# Patient Record
Sex: MALE | Race: WHITE | NOT HISPANIC OR LATINO | Employment: OTHER | URBAN - METROPOLITAN AREA
[De-identification: names, ages, dates, MRNs, and addresses within clinical notes are randomized per-mention and may not be internally consistent; named-entity substitution may affect disease eponyms.]

---

## 2021-11-17 ENCOUNTER — ALLIED HEALTH/NURSE VISIT (OUTPATIENT)
Dept: FAMILY MEDICINE | Facility: OTHER | Age: 76
End: 2021-11-17

## 2021-11-17 DIAGNOSIS — Z53.9 DIAGNOSIS FOR ++++ WALK IN CLINIC ++++: Primary | ICD-10-CM

## 2021-11-18 NOTE — PROGRESS NOTES
Patient walked into clinic.     The patient was seen for left foot redness, swelling, and open sore. [facility] The sore was located on the side of the patient's big toe. The foot was very red, warm to the touch, with +1 pitting edema. Patient has home care who comes into his home and the nurse jairo a line on where the redness stopped on the patient's ankle. The line was drawn two hours prior to coming. The redness was moving up the leg. Patient has a history of DM and a few weeks ago his right big toe was amputated.     Patient was evaluated by Nataliia Delgadillo PA-C.   During time in the clinic the symptoms were spreading quickly.       PLAN:   Patient was advised to be seen in ER tonight. The patient states that he isn't going tonight.       JHON BruceN, RN, PHN  [place]  November 17, 2021

## 2023-12-21 DIAGNOSIS — G89.29 OTHER CHRONIC PAIN: Primary | ICD-10-CM

## 2023-12-21 RX ORDER — OXYCODONE HYDROCHLORIDE 5 MG/1
5 TABLET ORAL EVERY 12 HOURS PRN
Qty: 60 TABLET | Refills: 0 | Status: SHIPPED | OUTPATIENT
Start: 2023-12-21 | End: 2024-03-05

## 2023-12-21 RX ORDER — PREGABALIN 150 MG/1
150 CAPSULE ORAL 2 TIMES DAILY
Qty: 60 CAPSULE | Refills: 3 | Status: SHIPPED | OUTPATIENT
Start: 2023-12-21 | End: 2024-01-26

## 2023-12-25 NOTE — PROGRESS NOTES
"[facility] GERIATRICS    PRIMARY CARE PROVIDER AND CLINIC:  DHAVAL Segura CNP, [address] / [place] [address]  Chief Complaint   Patient presents with    [facility] Medical Record Number:  [id number]  Place of Service where encounter took place:  [facility] () [[id number]]    Yandel Light  is a 78 year old  (1945), admitted to the above facility from  [facility]. Hospital stay 12/11/23 through 12/20/23..   HPI:    Yandel \"Jed\"Kuldeep is a 78-year-old male with past medical history significant for multiple comorbidities including atrial fibrillation on chronic anticoagulation, BPH, hypertension, type 2 diabetes on insulin, COPD, status post right-sided BKA, chronic pain syndrome, major depression, severe, with psychotic features, PTSD, and diabetic complications including neuropathy. The patient had been hospitalized in our facility 8/29/2023 to 9/1/2023 for generalized weakness and physical debility with inability to care for himself and was discharged to transitional care unit and has since transition back home apparently of the patient's own volition.     This admission, patient presents once again with inability to care for himself with no particular specific complaints. After transitioning back home, he fell and had mild trauma with a skin tear to the right wrist. Upon evaluation, patient had an unremarkable workup other than some elevated blood pressures that recovered nicely and an uncomplicated UTI that was successfully treated during this hospitalization. No acute abnormalities were identified. Patient has been hospitalized in a stable condition for more than a week we will reexplore outpatient community settings and ultimately he will discharge to a nursing home setting. Noted that medication modifications were relatively modest, gabapentin and oxycodone were added to help with chronic pain, nicotine replacement was added " for nicotine withdrawal symptoms, and melatonin was added to support sleep hygiene. His insulin dose needed to be slightly decreased likely related to dietary changes. Patient has been on stable psychotropic medications for quite [facility] His diagnosis of severe major depression with psychotic features is a recognized indication for the utilization of quetiapine as augmentation for duloxetine and seems to have served the patient well. Would strongly recommend against any adjustments to his psychotropic medications unless directed by his psychiatrist else he risks decompensation of his mental health.     The primary encounter diagnosis was Physical deconditioning. Diagnoses of Generalized muscle weakness, Unable to care for self, Resides in long term care facility, Status post below-knee amputation of right lower extremity (H), Chronic pain syndrome, Neuropathy, Benign prostatic hyperplasia, unspecified whether lower urinary tract symptoms present, Urinary tract infection without hematuria, site unspecified, Paroxysmal atrial fibrillation (H), Chronic anticoagulation, Hypertension, unspecified type, Chronic obstructive pulmonary disease, unspecified COPD type (H), Severe episode of recurrent major depressive disorder, with psychotic features (H), PTSD (post-traumatic stress disorder), Type 2 diabetes mellitus with other specified complication, unspecified whether long term insulin use (H), Tobacco abuse disorder, Type 2 diabetes mellitus with other skin ulcer, unspecified whether long term insulin use (H), Moderate malnutrition (H24), Osteomyelitis of ankle and foot (H), Alcohol dependence with unspecified alcohol-induced disorder (H), Open wound, and Other chronic pain were also pertinent to this visit.    Met with patient who was found lying in bed. He denies any chest pain, palpitations, shortness of breath, CLARK, lightheadedness, dizziness, or cough. Denies any abdominal discomfort.  Denies N&V. Denies B&B concerns. Denies dysuria or frequency. Denies loose or constipation. Incontinent of B&B. Has been having increase behaviors since admission due to reports of wanting to discharge home. I did advise him today that going home is not a safe plan as he has had several recent hospitalizations which lead to believe he is not safe to be home alone due to not taking care of himself properly. Appetite fair. He reports not liking the food on site. He reports sleeping fair. Wounds noted to LLE. See photos below. No worsening complaints of pain per reports. Reports good pain relief with current regimen.     BP Readings from Last 3 Encounters:   12/26/23 128/54     Wt Readings from Last 5 Encounters:   12/26/23 73 kg (161 lb)     CODE STATUS/ADVANCE DIRECTIVES DISCUSSION:  Full Code  CPR/Full code   ALLERGIES:   Allergies   Allergen Reactions    Lisinopril Anaphylaxis and Other (See Comments)    Codeine GI Disturbance    Penicillins Nausea, Nausea and Vomiting and Other (See Comments)      PAST MEDICAL HISTORY:   Past Medical History:   Diagnosis Date    Acquired absence of right leg below knee (H)     Adult failure to thrive     Age-related physical debility     Benign prostatic hyperplasia without lower urinary tract symptoms     BPH (benign prostatic hyperplasia)     Chronic atrial fibrillation, unspecified (H)     Chronic idiopathic pain syndrome     Chronic obstructive pulmonary disease, unspecified (H)     Enteroinvasive Escherichia coli infection     Essential (primary) hypertension     Generalized muscle weakness     Hyperlipidemia, unspecified     Long term current use of anticoagulant therapy     Major depressive disorder, recurrent, severe with psychotic symptoms (H)     Neuropathy     Nicotine dependence, cigarettes, with withdrawal     Open wound of knee, leg, and ankle, left, initial encounter     Post-traumatic stress disorder, unspecified     Severe recurrent major depressive disordor with  psychotic features     Tobacco use disorder     Type 2 diabetes mellitus with diabetic neuropathy, unspecified (H)     Urinary tract infection, site not specified       PAST SURGICAL HISTORY:   has a past surgical history that includes CT Head w/o & w Contrast; XR Chest 2 Views; XR Wrist Navicular Right G/E 3 Views; and below the knee amputation (Right).  FAMILY HISTORY: family history is not on file.  SOCIAL HISTORY:   reports that he has been smoking cigarettes. He has been smoking an average of 3 packs per day. He has never used smokeless tobacco. He reports that he does not drink alcohol.  Patient's living condition: lives alone    Post Discharge Medication Reconciliation Status:   MED REC REQUIRED  Post Medication Reconciliation Status:  Discharge medications reconciled and changed, see notes/orders       Current Outpatient Medications   Medication Sig    acetaminophen (TYLENOL) 500 MG tablet Take 1,000 mg by mouth every 6 hours as needed for pain    albuterol (PROAIR HFA/PROVENTIL HFA/VENTOLIN HFA) 108 (90 Base) MCG/ACT inhaler Inhale 2 puffs into the lungs every 4 hours as needed for shortness of breath    ammonium lactate (LAC-HYDRIN) 12 % external lotion Apply topically daily Apply to skin topically once daily for dry skin    apixaban ANTICOAGULANT (ELIQUIS) 5 MG tablet Take 1 tablet by mouth 2 times daily    atorvastatin (LIPITOR) 40 MG tablet Take 20 mg by mouth at bedtime For cholesterol    bisacodyl (DULCOLAX) 5 MG EC tablet Take 10 mg by mouth daily as needed for constipation    DULoxetine (CYMBALTA) 60 MG capsule Take 60 mg by mouth 2 times daily    finasteride (PROSCAR) 5 MG tablet Take 5 mg by mouth every morning For BPH with lower urinary tract    gabapentin (NEURONTIN) 100 MG capsule Take 200 mg by mouth 3 times daily For neuropathy    Insulin Glargine w/ Trans Port 100 UNIT/ML SOPN Inject 15 Units into the muscle daily DM2    magnesium oxide (MAG-OX) 400 MG tablet Take 400 mg by mouth 2 times  "daily    melatonin 3 MG tablet Take 1 tablet (3 mg) by mouth daily (with dinner)    metFORMIN (GLUCOPHAGE XR) 500 MG 24 hr tablet Take 2,000 mg by mouth daily (with dinner)    methocarbamol (ROBAXIN) 500 MG tablet Take 250 mg by mouth every 6 hours as needed for muscle spasms    metoprolol succinate ER (TOPROL XL) 25 MG 24 hr tablet Take 25 mg by mouth every morning HOLD if SBP<100 and/or HR<55    miconazole (MICATIN) 2 % external powder Apply 1 Application topically every 12 hours as needed for itching Apply to affected areas q12 hrs prn for rash skin irritation in morning and before bed    nicotine (NICODERM CQ) 21 MG/24HR 24 hr patch Place 1 patch onto the skin every 24 hours Apply 1 patch transdermally one time a day  for tobacco abuse and remove per schedule    oxyCODONE (ROXICODONE) 5 MG tablet Take 1 tablet (5 mg) by mouth every 12 hours as needed for pain    polyethylene glycol (MIRALAX) 17 g packet Take 17 g by mouth every 12 hours as needed for constipation    pregabalin (LYRICA) 150 MG capsule Take 1 capsule (150 mg) by mouth 2 times daily    QUEtiapine (SEROQUEL) 50 MG tablet Take 1 tablet (50 mg) by mouth 2 times daily For MDD with psychotic feautes    senna-docusate (SENOKOT-S/PERICOLACE) 8.6-50 MG tablet Take 2 tablets by mouth 2 times daily constipation    tamsulosin (FLOMAX) 0.4 MG capsule Take 1 capsule by mouth at bedtime BPH hyperplasia with lower urinary tract    Vitamin D3 (VITAMIN D-1000 MAX ST) 25 mcg (1000 units) tablet Take 1,000 Units by mouth daily For vitamin d def     No current facility-administered medications for this visit.       ROS:  4 point ROS including Respiratory, CV, GI and , other than that noted in the HPI,  is negative    Vitals:  /54   Pulse 84   Temp 97.5  F (36.4  C)   Resp 18   Ht 1.768 m (5' 9.6\")   Wt 73 kg (161 lb)   SpO2 93%   BMI 23.37 kg/m    Exam:  GENERAL APPEARANCE:  Alert, in no distress, cooperative  ENT:  Mouth and posterior oropharynx " normal, moist mucous membranes, [place]  EYES:  EOM, conjunctivae, lids, pupils and irises normal  NECK:  No adenopathy,masses or thyromegaly  RESP:  respiratory effort and palpation of chest normal, lungs clear to auscultation , no respiratory distress  CV:  Palpation and auscultation of heart done , regular rate and rhythm, no murmur, rub, or gallop, no edema to LLE. Stump stable today. Several scabs present to right LFA along with left leg. Wound present to left lateral malleous. See photos  ABDOMEN:  normal bowel sounds, soft, nontender, no hepatosplenomegaly or other masses, no guarding or rebound  M/S:   Wheelchair bound. Staff to assist for all ADLs, transfers and cares.   SKIN:  Inspection of skin and subcutaneous tissue baseline, several scabs present to right LFA, LLE and wound present to left lateral malleous. See photos  NEURO:   Cranial nerves 2-12 are normal tested and grossly at patient's baseline, no purposeful movement in upper and lower extremities  PSYCH:  oriented to self, insight and judgement impaired, memory impaired , affect and mood normal                    Lab/Diagnostic data:  Labs done in SNF are in [place] EPIC. Please refer to them using EPIC/Care Everywhere.    ASSESSMENT/PLAN:    (R53.81) Physical deconditioning  (primary encounter diagnosis)  (M62.81) Generalized muscle weakness  (Z78.9) Unable to care for self  (Z78.9) Resides in long term care facility  Comment: Acute on chronic. The patient had been hospitalized in our facility 8/29/2023 to 9/1/2023 for generalized weakness and physical debility with inability to care for himself and was discharged to transitional care unit and has since transition back home apparently of the patient's own volition. This admission, patient presents once again with inability to care for himself with no particular specific complaints. After transitioning back home, he fell and had mild trauma with a skin tear to the right wrist. Upon evaluation,  patient had an unremarkable workup other than some elevated blood pressures that recovered nicely and an uncomplicated UTI that was successfully treated during this hospitalization. No acute abnormalities were identified. Patient has been hospitalized in a stable condition for more than a week we will reexplore outpatient community settings and ultimately he will discharge to a nursing home setting.   Plan:   -Continue Physical therapy and Occupational therapy as directed  -SW to remain involved  -Recommend LTC/halfway for ongoing needs and cares    (Z89.511) Status post below-knee amputation of right lower extremity (H)  (G89.4) Chronic pain syndrome  (G62.9) Neuropathy  Comment: Chronic. Previously followed by [facility].   Plan:   -Monitor pain complaints  -Monitor for worsening s/symptoms of concerns  -Continue gabapentin 200mg TID- Adjust accordingly for pain needs  -Continue Oxycodone 5mg every 12 hours PRN  -Continue Tylenol 1000mg every 6 hours PRN  -Continue robaxin 250mg every 6 hours PRN  -Continue lyrica 150mg BID  -CMP, CBC, Hgb A1c, TSH, Free T4, vitamin D, vitamin B12, and magnesium due 12/28/23    (N40.0) Benign prostatic hyperplasia, unspecified whether lower urinary tract symptoms present  (N39.0) Urinary tract infection without hematuria, site unspecified  Comment: Chronic. This admission, patient presents once again with inability to care for himself with no particular specific complaints. After transitioning back home, he fell and had mild trauma with a skin tear to the right wrist. Upon evaluation, patient had an unremarkable workup other than some elevated blood pressures that recovered nicely and an uncomplicated UTI that was successfully treated during this hospitalization. No acute abnormalities were identified.  Plan:   -Monitor urinary status  -Continue finasteride 5mg daily  -Continue flomax 0.4mg daily  -CMP, CBC, Hgb A1c, TSH, Free T4, vitamin D, vitamin B12,  and magnesium due 12/28/23    (I48.0) Paroxysmal atrial fibrillation (H)  (Z79.01) Chronic anticoagulation  (I10) Hypertension, unspecified type  Comment: Chronic. Based on JNC-8 goals,  patients age of 78 year old, presence of diabetes or CKD, and goals of care goal BP is  <140/90 mm Hg. Patient is stable with current plan of care and routine assessment..Baseline Creatinine~0.6-0.9  Plan:   -Monitor BP and HR  -Continue atorvastatin 20mg daily  -Continue eliquis 5mg BID  -Continue metoprolol succinate 25mg daily. HOLD if SBP<100 and/or HR<55  -CMP, CBC, Hgb A1c, TSH, Free T4, vitamin D, vitamin B12, and magnesium due 12/28/23    (J44.9) Chronic obstructive pulmonary disease, unspecified COPD type (H)  (Z72.0) Tobacco abuse disorder  Comment: Chronic. Stable   Plan:   -Monitor for worsening s/symptoms of concerns  -Monitor respiratory status  -Continue albuterol PRN  -Continue nicotine patch as directed  -Discontinue nicotine gum due to non use  -CMP, CBC, Hgb A1c, TSH, Free T4, vitamin D, vitamin B12, and magnesium due 12/28/23    (F33.3) Severe episode of recurrent major depressive disorder, with psychotic features (H)  (F43.10) PTSD (post-traumatic stress disorder)  (F10.29) History of alcohol dependence  (E434.0) Moderate malnutrition  Comment: Acute on chronic. Per recent hospital discharge records-Patient has been on stable psychotropic medications for [facility]. His diagnosis of severe major depression with psychotic features is a recognized indication for the utilization of quetiapine as augmentation for duloxetine and seems to have served the patient well. Increased behaviors since LTC admission.  Plan:   -Start ACP on site for ongoing needs  -Continue to monitor for worsening s/symptoms of concerns  -Monitor for changes in mood and behaviors  -Monitor for changes in mobility, eating and sleeping patterns  -Change melatonin to 3mg daily with dinner.   -Continue duloxetine  60mg BID  -Increase seroquel to 50mg BID for now due to change in relocation status. GDR in future   -CMP, CBC, Hgb A1c, TSH, Free T4, vitamin D, vitamin B12, and magnesium due 12/28/23    (T14.8XXA) Open wound  (M86.9) History of osteomyelitis of ankle and foot  (E11.622,L98.499) Type 2 diabetes mellitus with other skin ulcer  Comment: Acute on chronic. Suspect S/T chronic falls, poor hygiene compliance and not able to care for self.   Plan:  -Monitor for worsening s/symptoms of concerns  -Start wound care as directed below:  *Wound care to right arm: Cleanse with wound cleanser. Pat Dry. Pain scabbed areas with betadine. Leave DOC. Perform daily and PRN until healed    *Wound care to left shin: Cleanse wound with wound cleanser. Pat Dry. Paint scabbed area with betadine. Leave DOC. Perform daily and PRN until healed    *Wound care to left foot 2nd toe: Cleanse with wound cleanser. Pat Dry. Apply bacitracin and cover with bandaid. Perform daily and PRN until healed    *Wound care to left lateral malleous/Ankle: Cleanse with wound cleanser. Pat Dry. Barrier skin prep to surround skin. Betadine to wound bed. Cover with mepilex daily and PRN until healed    (E11.69) Type 2 diabetes mellitus with other specified complication, unspecified whether long term insulin use (H)  Comment: Chronic. Last A1c per chart review was July 2021 with results 12.5%. Goal <9%.   Plan:   -Continue metformin 2000mg daily in AM  -Continue lantus 15U daily  -Monitor Blood Glucose BID as directed  -CMP, CBC, Hgb A1c, TSH, Free T4, vitamin D, vitamin B12, and magnesium due 12/28/23        Electronically signed by:  Dr. Lauren Tracy DNP, APRN, FNP-C, WCS-C, EDS-C

## 2023-12-26 ENCOUNTER — NURSING HOME VISIT (OUTPATIENT)
Dept: GERIATRICS | Facility: CLINIC | Age: 78
End: 2023-12-26

## 2023-12-26 VITALS
BODY MASS INDEX: 23.05 KG/M2 | HEIGHT: 70 IN | SYSTOLIC BLOOD PRESSURE: 128 MMHG | DIASTOLIC BLOOD PRESSURE: 54 MMHG | TEMPERATURE: 97.5 F | WEIGHT: 161 LBS | HEART RATE: 84 BPM | RESPIRATION RATE: 18 BRPM | OXYGEN SATURATION: 93 %

## 2023-12-26 DIAGNOSIS — L98.499 TYPE 2 DIABETES MELLITUS WITH OTHER SKIN ULCER, UNSPECIFIED WHETHER LONG TERM INSULIN USE (H): ICD-10-CM

## 2023-12-26 DIAGNOSIS — G89.29 OTHER CHRONIC PAIN: ICD-10-CM

## 2023-12-26 DIAGNOSIS — Z89.511 STATUS POST BELOW-KNEE AMPUTATION OF RIGHT LOWER EXTREMITY (H): ICD-10-CM

## 2023-12-26 DIAGNOSIS — Z72.0 TOBACCO ABUSE DISORDER: ICD-10-CM

## 2023-12-26 DIAGNOSIS — I48.0 PAROXYSMAL ATRIAL FIBRILLATION (H): ICD-10-CM

## 2023-12-26 DIAGNOSIS — F10.29 ALCOHOL DEPENDENCE WITH UNSPECIFIED ALCOHOL-INDUCED DISORDER (H): ICD-10-CM

## 2023-12-26 DIAGNOSIS — M62.81 GENERALIZED MUSCLE WEAKNESS: ICD-10-CM

## 2023-12-26 DIAGNOSIS — Z78.9 RESIDES IN LONG TERM CARE FACILITY: ICD-10-CM

## 2023-12-26 DIAGNOSIS — Z79.01 CHRONIC ANTICOAGULATION: ICD-10-CM

## 2023-12-26 DIAGNOSIS — J44.9 CHRONIC OBSTRUCTIVE PULMONARY DISEASE, UNSPECIFIED COPD TYPE (H): ICD-10-CM

## 2023-12-26 DIAGNOSIS — E11.69 TYPE 2 DIABETES MELLITUS WITH OTHER SPECIFIED COMPLICATION, UNSPECIFIED WHETHER LONG TERM INSULIN USE (H): ICD-10-CM

## 2023-12-26 DIAGNOSIS — G62.9 NEUROPATHY: ICD-10-CM

## 2023-12-26 DIAGNOSIS — R53.81 PHYSICAL DECONDITIONING: Primary | ICD-10-CM

## 2023-12-26 DIAGNOSIS — I10 HYPERTENSION, UNSPECIFIED TYPE: ICD-10-CM

## 2023-12-26 DIAGNOSIS — G89.4 CHRONIC PAIN SYNDROME: ICD-10-CM

## 2023-12-26 DIAGNOSIS — F33.3 SEVERE EPISODE OF RECURRENT MAJOR DEPRESSIVE DISORDER, WITH PSYCHOTIC FEATURES (H): ICD-10-CM

## 2023-12-26 DIAGNOSIS — E11.622 TYPE 2 DIABETES MELLITUS WITH OTHER SKIN ULCER, UNSPECIFIED WHETHER LONG TERM INSULIN USE (H): ICD-10-CM

## 2023-12-26 DIAGNOSIS — N40.0 BENIGN PROSTATIC HYPERPLASIA, UNSPECIFIED WHETHER LOWER URINARY TRACT SYMPTOMS PRESENT: ICD-10-CM

## 2023-12-26 DIAGNOSIS — E44.0 MODERATE MALNUTRITION (H): ICD-10-CM

## 2023-12-26 DIAGNOSIS — M86.9 OSTEOMYELITIS OF ANKLE AND FOOT (H): ICD-10-CM

## 2023-12-26 DIAGNOSIS — Z78.9 UNABLE TO CARE FOR SELF: ICD-10-CM

## 2023-12-26 DIAGNOSIS — F43.10 PTSD (POST-TRAUMATIC STRESS DISORDER): ICD-10-CM

## 2023-12-26 DIAGNOSIS — N39.0 URINARY TRACT INFECTION WITHOUT HEMATURIA, SITE UNSPECIFIED: ICD-10-CM

## 2023-12-26 DIAGNOSIS — T14.8XXA OPEN WOUND: ICD-10-CM

## 2023-12-26 PROCEDURE — 99309 SBSQ NF CARE MODERATE MDM 30: CPT | Performed by: NURSE PRACTITIONER

## 2023-12-26 RX ORDER — TAMSULOSIN HYDROCHLORIDE 0.4 MG/1
1 CAPSULE ORAL AT BEDTIME
COMMUNITY

## 2023-12-26 RX ORDER — DULOXETIN HYDROCHLORIDE 60 MG/1
60 CAPSULE, DELAYED RELEASE ORAL 2 TIMES DAILY
COMMUNITY
Start: 2023-06-28

## 2023-12-26 RX ORDER — VITAMIN B COMPLEX
1000 TABLET ORAL DAILY
COMMUNITY

## 2023-12-26 RX ORDER — QUETIAPINE FUMARATE 50 MG/1
50 TABLET, FILM COATED ORAL 2 TIMES DAILY
Qty: 60 TABLET | Refills: 11 | Status: SHIPPED | OUTPATIENT
Start: 2023-12-26

## 2023-12-26 RX ORDER — POLYETHYLENE GLYCOL 3350 17 G/17G
17 POWDER, FOR SOLUTION ORAL EVERY 12 HOURS PRN
COMMUNITY

## 2023-12-26 RX ORDER — NICOTINE 21 MG/24HR
1 PATCH, TRANSDERMAL 24 HOURS TRANSDERMAL EVERY 24 HOURS
COMMUNITY
Start: 2023-12-20 | End: 2024-01-22

## 2023-12-26 RX ORDER — ALBUTEROL SULFATE 90 UG/1
2 AEROSOL, METERED RESPIRATORY (INHALATION) EVERY 4 HOURS PRN
COMMUNITY

## 2023-12-26 RX ORDER — AMOXICILLIN 250 MG
2 CAPSULE ORAL 2 TIMES DAILY
COMMUNITY
Start: 2023-06-28

## 2023-12-26 RX ORDER — METOPROLOL SUCCINATE 25 MG/1
25 TABLET, EXTENDED RELEASE ORAL EVERY MORNING
COMMUNITY

## 2023-12-26 RX ORDER — AMMONIUM LACTATE 12 G/100G
LOTION TOPICAL DAILY
COMMUNITY

## 2023-12-26 RX ORDER — PREGABALIN 50 MG/1
150 CAPSULE ORAL 2 TIMES DAILY
COMMUNITY
Start: 2023-06-28 | End: 2023-12-26

## 2023-12-26 RX ORDER — FINASTERIDE 5 MG/1
5 TABLET, FILM COATED ORAL EVERY MORNING
COMMUNITY

## 2023-12-26 RX ORDER — ATORVASTATIN CALCIUM 40 MG/1
20 TABLET, FILM COATED ORAL AT BEDTIME
COMMUNITY

## 2023-12-26 RX ORDER — METHOCARBAMOL 500 MG/1
250 TABLET, FILM COATED ORAL EVERY 6 HOURS PRN
COMMUNITY
Start: 2023-06-28 | End: 2024-03-19

## 2023-12-26 RX ORDER — MAGNESIUM OXIDE 400 MG/1
400 TABLET ORAL 2 TIMES DAILY
COMMUNITY

## 2023-12-26 RX ORDER — LANOLIN ALCOHOL/MO/W.PET/CERES
3 CREAM (GRAM) TOPICAL
COMMUNITY
Start: 2023-12-19 | End: 2023-12-26

## 2023-12-26 RX ORDER — ACETAMINOPHEN 500 MG
1000 TABLET ORAL EVERY 6 HOURS PRN
COMMUNITY
End: 2024-03-05

## 2023-12-26 RX ORDER — METFORMIN HCL 500 MG
2000 TABLET, EXTENDED RELEASE 24 HR ORAL
COMMUNITY

## 2023-12-26 RX ORDER — QUETIAPINE FUMARATE 50 MG/1
1 TABLET, FILM COATED ORAL AT BEDTIME
COMMUNITY
Start: 2023-06-28 | End: 2023-12-26

## 2023-12-26 RX ORDER — GABAPENTIN 100 MG/1
200 CAPSULE ORAL 3 TIMES DAILY
COMMUNITY
Start: 2023-12-19

## 2023-12-26 RX ORDER — BISACODYL 5 MG/1
10 TABLET, DELAYED RELEASE ORAL DAILY PRN
COMMUNITY
Start: 2023-06-28

## 2023-12-26 RX ORDER — LANOLIN ALCOHOL/MO/W.PET/CERES
3 CREAM (GRAM) TOPICAL
Qty: 30 TABLET | Refills: 11 | Status: SHIPPED | OUTPATIENT
Start: 2023-12-26

## 2023-12-27 ENCOUNTER — LAB REQUISITION (OUTPATIENT)
Dept: LAB | Facility: CLINIC | Age: 78
End: 2023-12-27

## 2023-12-27 DIAGNOSIS — E11.40 TYPE 2 DIABETES MELLITUS WITH DIABETIC NEUROPATHY, UNSPECIFIED (H): ICD-10-CM

## 2023-12-27 DIAGNOSIS — F33.3 MAJOR DEPRESSIVE DISORDER, RECURRENT, SEVERE WITH PSYCHOTIC SYMPTOMS (H): ICD-10-CM

## 2023-12-27 DIAGNOSIS — F43.10 POST-TRAUMATIC STRESS DISORDER, UNSPECIFIED: ICD-10-CM

## 2023-12-27 DIAGNOSIS — I10 ESSENTIAL (PRIMARY) HYPERTENSION: ICD-10-CM

## 2023-12-27 DIAGNOSIS — I48.20 CHRONIC ATRIAL FIBRILLATION, UNSPECIFIED (H): ICD-10-CM

## 2024-01-04 ENCOUNTER — LAB REQUISITION (OUTPATIENT)
Dept: LAB | Facility: CLINIC | Age: 79
End: 2024-01-04

## 2024-01-04 DIAGNOSIS — F43.10 POST-TRAUMATIC STRESS DISORDER, UNSPECIFIED: ICD-10-CM

## 2024-01-04 DIAGNOSIS — E11.40 TYPE 2 DIABETES MELLITUS WITH DIABETIC NEUROPATHY, UNSPECIFIED (H): ICD-10-CM

## 2024-01-04 DIAGNOSIS — F33.3 MAJOR DEPRESSIVE DISORDER, RECURRENT, SEVERE WITH PSYCHOTIC SYMPTOMS (H): ICD-10-CM

## 2024-01-04 DIAGNOSIS — I48.20 CHRONIC ATRIAL FIBRILLATION, UNSPECIFIED (H): ICD-10-CM

## 2024-01-04 DIAGNOSIS — I10 ESSENTIAL (PRIMARY) HYPERTENSION: ICD-10-CM

## 2024-01-22 ENCOUNTER — NURSING HOME VISIT (OUTPATIENT)
Dept: GERIATRICS | Facility: CLINIC | Age: 79
End: 2024-01-22

## 2024-01-22 VITALS
RESPIRATION RATE: 18 BRPM | SYSTOLIC BLOOD PRESSURE: 147 MMHG | WEIGHT: 162 LBS | TEMPERATURE: 97.2 F | HEIGHT: 70 IN | HEART RATE: 80 BPM | BODY MASS INDEX: 23.19 KG/M2 | OXYGEN SATURATION: 96 % | DIASTOLIC BLOOD PRESSURE: 74 MMHG

## 2024-01-22 DIAGNOSIS — Z79.4 TYPE 2 DIABETES MELLITUS WITH OTHER SPECIFIED COMPLICATION, WITH LONG-TERM CURRENT USE OF INSULIN (H): Primary | ICD-10-CM

## 2024-01-22 DIAGNOSIS — F43.10 PTSD (POST-TRAUMATIC STRESS DISORDER): ICD-10-CM

## 2024-01-22 DIAGNOSIS — I48.0 PAROXYSMAL ATRIAL FIBRILLATION (H): ICD-10-CM

## 2024-01-22 DIAGNOSIS — J44.9 CHRONIC OBSTRUCTIVE PULMONARY DISEASE, UNSPECIFIED COPD TYPE (H): ICD-10-CM

## 2024-01-22 DIAGNOSIS — F33.3 SEVERE EPISODE OF RECURRENT MAJOR DEPRESSIVE DISORDER, WITH PSYCHOTIC FEATURES (H): ICD-10-CM

## 2024-01-22 DIAGNOSIS — Z89.511 STATUS POST BELOW-KNEE AMPUTATION OF RIGHT LOWER EXTREMITY (H): ICD-10-CM

## 2024-01-22 DIAGNOSIS — G89.4 CHRONIC PAIN SYNDROME: ICD-10-CM

## 2024-01-22 DIAGNOSIS — G63 POLYNEUROPATHY ASSOCIATED WITH UNDERLYING DISEASE (H): ICD-10-CM

## 2024-01-22 DIAGNOSIS — E11.69 TYPE 2 DIABETES MELLITUS WITH OTHER SPECIFIED COMPLICATION, WITH LONG-TERM CURRENT USE OF INSULIN (H): Primary | ICD-10-CM

## 2024-01-22 PROCEDURE — 99305 1ST NF CARE MODERATE MDM 35: CPT | Performed by: INTERNAL MEDICINE

## 2024-01-22 NOTE — PROGRESS NOTES
[facility] GERIATRICS  INITIAL VISIT NOTE  January 22, 2024    PRIMARY CARE PROVIDER AND CLINIC:  Lauren Tarcy [address] / [place] [address]    [facility] Medical Record Number:  [id number]  Place of Service where encounter took place:  [facility] () [[id number]]    Chief Complaint   Patient presents with    [facility]       HPI:    Yandel Light is a 78 year old  (1945) male seen today at [facility]. Medical history is notable for COPD, s/p R BKA, DM 2, a-fib, PTSD, depression with psychosis and BPH. He was most recently hospitalized at [facility] in [place] from 12/11/23 to 12/20/23 where he presented with inability to care for himself at home. He was admitted to this facility for  medical management and nursing care.      History obtained from: facility chart records, facility staff, patient report, [facility] chart review, and Care Everywhere [facility] chart review.      Today, Mr. Light is seen in his room sitting in a wheelchair. He is a limited historian (BIMS 0/15). Tells me the only thing he wants to do is call a taxi to take him home. Tried to talk with him about his blood sugars and adjusting insulin - he was agreeable to this. Talked with nursing, no acute concerns today.     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 (H)     Adult failure to thrive     Age-related physical debility     Benign prostatic hyperplasia without lower urinary tract symptoms     BPH (benign prostatic hyperplasia)     Chronic atrial fibrillation, unspecified (H)     Chronic idiopathic pain syndrome     Chronic obstructive pulmonary disease, unspecified (H)     Enteroinvasive Escherichia coli  infection     Essential (primary) hypertension     Generalized muscle weakness     Hyperlipidemia, unspecified     Long term current use of anticoagulant therapy     Major depressive disorder, recurrent, severe with psychotic symptoms (H)     Neuropathy     Nicotine dependence, cigarettes, with withdrawal     Open wound of knee, leg, and ankle, left, initial encounter     Post-traumatic stress disorder, unspecified     Severe recurrent major depressive disordor with psychotic features     Tobacco use disorder     Type 2 diabetes mellitus with diabetic neuropathy, unspecified (H)     Urinary tract infection, site not specified        PAST SURGICAL HISTORY:   Past Surgical History:   Procedure Laterality Date    below the knee amputation Right     CT HEAD W/O & W CONTRAST      XR CHEST 2 VIEWS      XR WRIST NAVICULAR RIGHT G/E 3 VIEWS         SOCIAL HISTORY:   Patient's living condition: lives in a skilled nursing facility    MEDICATIONS:  Post Discharge Medication Reconciliation Status: discharge medications reconciled and changed, per note/orders.  Current Outpatient Medications   Medication Sig Dispense Refill    acetaminophen (TYLENOL) 500 MG tablet Take 1,000 mg by mouth every 6 hours as needed for pain      albuterol (PROAIR HFA/PROVENTIL HFA/VENTOLIN HFA) 108 (90 Base) MCG/ACT inhaler Inhale 2 puffs into the lungs every 4 hours as needed for shortness of breath      ammonium lactate (LAC-HYDRIN) 12 % external lotion Apply topically daily Apply to skin topically once daily for dry skin      apixaban ANTICOAGULANT (ELIQUIS) 5 MG tablet Take 1 tablet by mouth 2 times daily      atorvastatin (LIPITOR) 40 MG tablet Take 20 mg by mouth at bedtime For cholesterol      bisacodyl (DULCOLAX) 5 MG EC tablet Take 10 mg by mouth daily as needed for constipation      DULoxetine (CYMBALTA) 60 MG capsule Take 60 mg by mouth 2 times daily      finasteride (PROSCAR) 5 MG tablet Take 5 mg by mouth every morning For BPH with lower  "urinary tract      gabapentin (NEURONTIN) 100 MG capsule Take 200 mg by mouth 3 times daily For neuropathy      Insulin Glargine w/ Trans Port 100 UNIT/ML SOPN Inject 10 Units into the muscle daily Decreased from 15 units starting 1/23/24      magnesium oxide (MAG-OX) 400 MG tablet Take 400 mg by mouth 2 times daily      melatonin 3 MG tablet Take 1 tablet (3 mg) by mouth daily (with dinner) 30 tablet 11    metFORMIN (GLUCOPHAGE XR) 500 MG 24 hr tablet Take 2,000 mg by mouth daily (with dinner)      methocarbamol (ROBAXIN) 500 MG tablet Take 250 mg by mouth every 6 hours as needed for muscle spasms      metoprolol succinate ER (TOPROL XL) 25 MG 24 hr tablet Take 25 mg by mouth every morning HOLD if SBP<100 and/or HR<55      miconazole (MICATIN) 2 % external powder Apply 1 Application topically every 12 hours as needed for itching Apply to affected areas q12 hrs prn for rash skin irritation in morning and before bed      oxyCODONE (ROXICODONE) 5 MG tablet Take 1 tablet (5 mg) by mouth every 12 hours as needed for pain 60 tablet 0    polyethylene glycol (MIRALAX) 17 g packet Take 17 g by mouth every 12 hours as needed for constipation      pregabalin (LYRICA) 150 MG capsule Take 1 capsule (150 mg) by mouth 2 times daily 60 capsule 3    QUEtiapine (SEROQUEL) 50 MG tablet Take 1 tablet (50 mg) by mouth 2 times daily For MDD with psychotic feautes 60 tablet 11    senna-docusate (SENOKOT-S/PERICOLACE) 8.6-50 MG tablet Take 2 tablets by mouth 2 times daily constipation      tamsulosin (FLOMAX) 0.4 MG capsule Take 1 capsule by mouth at bedtime BPH hyperplasia with lower urinary tract      Vitamin D3 (VITAMIN D-1000 MAX ST) 25 mcg (1000 units) tablet Take 1,000 Units by mouth daily For vitamin d def         ROS:  Unable to obtain due to cognitive impairment or aphasia    PHYSICAL EXAM:  BP (!) 147/74   Pulse 80   Temp 97.2  F (36.2  C)   Resp 18   Ht 1.768 m (5' 9.6\")   Wt 73.5 kg (162 lb)   SpO2 96%   BMI 23.51 kg/m  "   Gen: sitting in a wheelchair, alert, cooperative and in no acute distress  Resp: breathing non labored, no tachypnea  Ext:  s/p R BKA  Neuro: CX II-XII grossly in tact; ROM in all four extremities grossly in tact  Psych: memory, judgement and insight impaired    LABORATORY/IMAGING DATA:  Reviewed as per [facility] and/or [facility]    ASSESSMENT/PLAN:    DM, Type II  Hgb A1c - none recent Sugars 110s-150s (am) and 80s-130s (delfin)  -- decrease glargine from 15 to 10 units given later day sugars in 80s-130s  -- update for glucose >250  -- metformin 2000 mg with dinner  -- follow sugars and adjust insulin as needed    Chronic Atrial Fibrillation  HR 70s  -- metoprolol XL 25 mg daily   -- apixaban 5 mg BID    PTSD  Depression with Psychosis  Cognitive Impairment  Mood and spirits seemed OK today - hard to assess, he made good eye contact  -- quetiapine 50 mg BID  -- ongoing 24/7 nursing and supportive cares    COPD  No signs of exacerbation  -- albuterol MDI PRN    PAD  S/p Right BKA  -- atorvastatin 20 mg at bedtime     Peripheral Neuropathy  -- duloxetine 60 mg BID, gabapentin 200 mg TID, pregabalin 150 mg BID   -- methocarbamol 250 mg q6h PRN, oxycodone 5 mg q12h PRN     BPH  -- finasteride 5 mg daily, tamsulosin 0.4 mg daily     Slow Transit Constipation  -- Miralax 17g BID PRN and Senna-S 2 tabs BID   -- adjust bowel regimen as needed      Electronically signed by:  Morenita Donovan MD

## 2024-01-22 NOTE — LETTER
1/22/2024        RE: Yandel Light  [address] [place] [facility] [address]        [facility] GERIATRICS  INITIAL VISIT NOTE  January 22, 2024    PRIMARY CARE PROVIDER AND CLINIC:  Lauren Tracy [address] / [place] [address]    [facility] Medical Record Number:  [id number]  Place of Service where encounter took place:  [facility] () [[id number]]    Chief Complaint   Patient presents with     [facility] Care       HPI:    Yandel Light is a 78 year old  (1945) male seen today at [facility]. Medical history is notable for COPD, s/p R BKA, DM 2, a-fib, PTSD, depression with psychosis and BPH. He was most recently hospitalized at [facility] in [place] from 12/11/23 to 12/20/23 where he presented with inability to care for himself at home. He was admitted to this facility for  medical management and nursing care.      History obtained from: facility chart records, facility staff, patient report, [facility] chart review, and [facility] chart review.      Today, Mr. Light is seen in his room sitting in a wheelchair. He is a limited historian (BIMS 0/15). Tells me the only thing he wants to do is call a taxi to take him home. Tried to talk with him about his blood sugars and adjusting insulin - he was agreeable to this. Talked with nursing, no acute concerns today.     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)      Adult failure to thrive      Age-related physical debility      Benign prostatic hyperplasia without lower urinary tract symptoms      BPH (benign prostatic hyperplasia)      Chronic atrial fibrillation, unspecified (H)      Chronic idiopathic pain  syndrome      Chronic obstructive pulmonary disease, unspecified (H)      Enteroinvasive Escherichia coli infection      Essential (primary) hypertension      Generalized muscle weakness      Hyperlipidemia, unspecified      Long term current use of anticoagulant therapy      Major depressive disorder, recurrent, severe with psychotic symptoms (H)      Neuropathy      Nicotine dependence, cigarettes, with withdrawal      Open wound of knee, leg, and ankle, left, initial encounter      Post-traumatic stress disorder, unspecified      Severe recurrent major depressive disordor with psychotic features      Tobacco use disorder      Type 2 diabetes mellitus with diabetic neuropathy, unspecified (H)      Urinary tract infection, site not specified        PAST SURGICAL HISTORY:   Past Surgical History:   Procedure Laterality Date     below the knee amputation Right      CT HEAD W/O & W CONTRAST       XR CHEST 2 VIEWS       XR WRIST NAVICULAR RIGHT G/E 3 VIEWS         SOCIAL HISTORY:   Patient's living condition: lives in a skilled nursing facility    MEDICATIONS:  Post Discharge Medication Reconciliation Status: discharge medications reconciled and changed, per note/orders.  Current Outpatient Medications   Medication Sig Dispense Refill     acetaminophen (TYLENOL) 500 MG tablet Take 1,000 mg by mouth every 6 hours as needed for pain       albuterol (PROAIR HFA/PROVENTIL HFA/VENTOLIN HFA) 108 (90 Base) MCG/ACT inhaler Inhale 2 puffs into the lungs every 4 hours as needed for shortness of breath       ammonium lactate (LAC-HYDRIN) 12 % external lotion Apply topically daily Apply to skin topically once daily for dry skin       apixaban ANTICOAGULANT (ELIQUIS) 5 MG tablet Take 1 tablet by mouth 2 times daily       atorvastatin (LIPITOR) 40 MG tablet Take 20 mg by mouth at bedtime For cholesterol       bisacodyl (DULCOLAX) 5 MG EC tablet Take 10 mg by mouth daily as needed for constipation       DULoxetine (CYMBALTA) 60 MG  capsule Take 60 mg by mouth 2 times daily       finasteride (PROSCAR) 5 MG tablet Take 5 mg by mouth every morning For BPH with lower urinary tract       gabapentin (NEURONTIN) 100 MG capsule Take 200 mg by mouth 3 times daily For neuropathy       Insulin Glargine w/ Trans Port 100 UNIT/ML SOPN Inject 10 Units into the muscle daily Decreased from 15 units starting 1/23/24       magnesium oxide (MAG-OX) 400 MG tablet Take 400 mg by mouth 2 times daily       melatonin 3 MG tablet Take 1 tablet (3 mg) by mouth daily (with dinner) 30 tablet 11     metFORMIN (GLUCOPHAGE XR) 500 MG 24 hr tablet Take 2,000 mg by mouth daily (with dinner)       methocarbamol (ROBAXIN) 500 MG tablet Take 250 mg by mouth every 6 hours as needed for muscle spasms       metoprolol succinate ER (TOPROL XL) 25 MG 24 hr tablet Take 25 mg by mouth every morning HOLD if SBP<100 and/or HR<55       miconazole (MICATIN) 2 % external powder Apply 1 Application topically every 12 hours as needed for itching Apply to affected areas q12 hrs prn for rash skin irritation in morning and before bed       oxyCODONE (ROXICODONE) 5 MG tablet Take 1 tablet (5 mg) by mouth every 12 hours as needed for pain 60 tablet 0     polyethylene glycol (MIRALAX) 17 g packet Take 17 g by mouth every 12 hours as needed for constipation       pregabalin (LYRICA) 150 MG capsule Take 1 capsule (150 mg) by mouth 2 times daily 60 capsule 3     QUEtiapine (SEROQUEL) 50 MG tablet Take 1 tablet (50 mg) by mouth 2 times daily For MDD with psychotic feautes 60 tablet 11     senna-docusate (SENOKOT-S/PERICOLACE) 8.6-50 MG tablet Take 2 tablets by mouth 2 times daily constipation       tamsulosin (FLOMAX) 0.4 MG capsule Take 1 capsule by mouth at bedtime BPH hyperplasia with lower urinary tract       Vitamin D3 (VITAMIN D-1000 MAX ST) 25 mcg (1000 units) tablet Take 1,000 Units by mouth daily For vitamin d def         ROS:  Unable to obtain due to cognitive impairment or  "aphasia    PHYSICAL EXAM:  BP (!) 147/74   Pulse 80   Temp 97.2  F (36.2  C)   Resp 18   Ht 1.768 m (5' 9.6\")   Wt 73.5 kg (162 lb)   SpO2 96%   BMI 23.51 kg/m    Gen: sitting in a wheelchair, alert, cooperative and in no acute distress  Resp: breathing non labored, no tachypnea  Ext:  s/p R BKA  Neuro: CX II-XII grossly in tact; ROM in all four extremities grossly in tact  Psych: memory, judgement and insight impaired    LABORATORY/IMAGING DATA:  Reviewed as per [facility] and[facility]    ASSESSMENT/PLAN:    DM, Type II  Hgb A1c - none recent Sugars 110s-150s (am) and 80s-130s (delfin)  -- decrease glargine from 15 to 10 units given later day sugars in 80s-130s  -- update for glucose >250  -- metformin 2000 mg with dinner  -- follow sugars and adjust insulin as needed    Chronic Atrial Fibrillation  HR 70s  -- metoprolol XL 25 mg daily   -- apixaban 5 mg BID    PTSD  Depression with Psychosis  Cognitive Impairment  Mood and spirits seemed OK today - hard to assess, he made good eye contact  -- quetiapine 50 mg BID  -- ongoing 24/7 nursing and supportive cares    COPD  No signs of exacerbation  -- albuterol MDI PRN    PAD  S/p Right BKA  -- atorvastatin 20 mg at bedtime     Peripheral Neuropathy  -- duloxetine 60 mg BID, gabapentin 200 mg TID, pregabalin 150 mg BID   -- methocarbamol 250 mg q6h PRN, oxycodone 5 mg q12h PRN     BPH  -- finasteride 5 mg daily, tamsulosin 0.4 mg daily     Slow Transit Constipation  -- Miralax 17g BID PRN and Senna-S 2 tabs BID   -- adjust bowel regimen as needed      Electronically signed by:  Morenita Donovan MD                          Sincerely,        Morenita Donovan MD      "

## 2024-01-26 DIAGNOSIS — G89.29 OTHER CHRONIC PAIN: ICD-10-CM

## 2024-01-26 RX ORDER — PREGABALIN 75 MG/1
75 CAPSULE ORAL 2 TIMES DAILY
Qty: 14 CAPSULE | Refills: 0 | Status: SHIPPED | OUTPATIENT
Start: 2024-01-26 | End: 2024-02-20

## 2024-02-14 ENCOUNTER — DOCUMENTATION ONLY (OUTPATIENT)
Dept: GERIATRICS | Facility: CLINIC | Age: 79
End: 2024-02-14
Payer: COMMERCIAL

## 2024-02-20 ENCOUNTER — NURSING HOME VISIT (OUTPATIENT)
Dept: GERIATRICS | Facility: CLINIC | Age: 79
End: 2024-02-20
Payer: COMMERCIAL

## 2024-02-20 VITALS
HEIGHT: 70 IN | DIASTOLIC BLOOD PRESSURE: 67 MMHG | RESPIRATION RATE: 16 BRPM | BODY MASS INDEX: 23.59 KG/M2 | SYSTOLIC BLOOD PRESSURE: 134 MMHG | OXYGEN SATURATION: 93 % | TEMPERATURE: 97.5 F | HEART RATE: 73 BPM | WEIGHT: 164.8 LBS

## 2024-02-20 DIAGNOSIS — Z79.4 TYPE 2 DIABETES MELLITUS WITH OTHER SPECIFIED COMPLICATION, WITH LONG-TERM CURRENT USE OF INSULIN (H): Primary | ICD-10-CM

## 2024-02-20 DIAGNOSIS — G89.4 CHRONIC PAIN SYNDROME: ICD-10-CM

## 2024-02-20 DIAGNOSIS — E11.69 TYPE 2 DIABETES MELLITUS WITH OTHER SPECIFIED COMPLICATION, WITH LONG-TERM CURRENT USE OF INSULIN (H): Primary | ICD-10-CM

## 2024-02-20 DIAGNOSIS — Z89.511 STATUS POST BELOW-KNEE AMPUTATION OF RIGHT LOWER EXTREMITY (H): ICD-10-CM

## 2024-02-20 DIAGNOSIS — F43.10 PTSD (POST-TRAUMATIC STRESS DISORDER): ICD-10-CM

## 2024-02-20 DIAGNOSIS — F33.3 SEVERE EPISODE OF RECURRENT MAJOR DEPRESSIVE DISORDER, WITH PSYCHOTIC FEATURES (H): ICD-10-CM

## 2024-02-20 DIAGNOSIS — I48.0 PAROXYSMAL ATRIAL FIBRILLATION (H): ICD-10-CM

## 2024-02-20 DIAGNOSIS — G63 POLYNEUROPATHY ASSOCIATED WITH UNDERLYING DISEASE (H): ICD-10-CM

## 2024-02-20 PROBLEM — M86.9 OSTEOMYELITIS OF ANKLE AND FOOT (H): Status: RESOLVED | Noted: 2023-12-26 | Resolved: 2024-02-20

## 2024-02-20 PROBLEM — L98.499: Status: RESOLVED | Noted: 2023-12-26 | Resolved: 2024-02-20

## 2024-02-20 PROBLEM — E11.622: Status: RESOLVED | Noted: 2023-12-26 | Resolved: 2024-02-20

## 2024-02-20 PROBLEM — E44.0 MODERATE MALNUTRITION (H): Status: RESOLVED | Noted: 2023-12-26 | Resolved: 2024-02-20

## 2024-02-20 PROBLEM — F10.29 ALCOHOL DEPENDENCE WITH UNSPECIFIED ALCOHOL-INDUCED DISORDER (H): Status: RESOLVED | Noted: 2023-12-26 | Resolved: 2024-02-20

## 2024-02-20 PROCEDURE — 99309 SBSQ NF CARE MODERATE MDM 30: CPT | Performed by: INTERNAL MEDICINE

## 2024-02-20 RX ORDER — TRAZODONE HYDROCHLORIDE 50 MG/1
50 TABLET, FILM COATED ORAL AT BEDTIME
COMMUNITY

## 2024-02-20 NOTE — PROGRESS NOTES
[facility] GERIATRICS  REGULATORY VISIT  February 20, 2024    [facility] Medical Record Number:  [id number]  Place of Service where encounter took place:  [facility] () [[id number]]    Chief Complaint   Patient presents with    retirement Regulatory       HPI:    Yandel Light is a 78 year old  (1945), who is being seen today for a federally mandated E/M visit at [facility] where he has resided since December 2023 after he was hospitalized with inability to care for himself at home. HPI information obtained from: facility chart records, facility staff, patient report, and [facility] chart review.    Today, Mr. Light is seen in his power wheelchair. He is a limited historian (BIMS 0/15). He was out to smoke, I found him returning his coffee mug to the [facility] dining room and then we went back outside while talking. He tells me he just wants to go home and is planning to go home this weekend [facility]. [facility] also [facility]       ALLERGIES:    Allergies   Allergen Reactions    Lisinopril Anaphylaxis and Other (See Comments)    Codeine GI Disturbance    Penicillins Nausea, Nausea and Vomiting and Other (See Comments)        Past Medical, Surgical, Family and Social History: Reviewed and updated in EPIC.    MEDICATIONS:  Current Outpatient Medications   Medication Sig Dispense Refill    acetaminophen (TYLENOL) 500 MG tablet Take 1,000 mg by mouth every 6 hours as needed for pain      albuterol (PROAIR HFA/PROVENTIL HFA/VENTOLIN HFA) 108 (90 Base) MCG/ACT inhaler Inhale 2 puffs into the lungs every 4 hours as needed for shortness of breath      ammonium lactate (LAC-HYDRIN) 12 % external lotion Apply topically daily Apply to skin topically once daily for dry skin      apixaban ANTICOAGULANT (ELIQUIS) 5 MG tablet Take 1 tablet by mouth 2 times daily      atorvastatin (LIPITOR) 40 MG tablet Take 20 mg by mouth at  bedtime For cholesterol      bisacodyl (DULCOLAX) 5 MG EC tablet Take 10 mg by mouth daily as needed for constipation      DULoxetine (CYMBALTA) 60 MG capsule Take 60 mg by mouth 2 times daily      finasteride (PROSCAR) 5 MG tablet Take 5 mg by mouth every morning For BPH with lower urinary tract      gabapentin (NEURONTIN) 100 MG capsule Take 200 mg by mouth 3 times daily For neuropathy      Insulin Glargine w/ Trans Port 100 UNIT/ML SOPN Inject 10 Units into the muscle daily Decreased from 15 units starting 1/23/24      magnesium oxide (MAG-OX) 400 MG tablet Take 400 mg by mouth 2 times daily      melatonin 3 MG tablet Take 1 tablet (3 mg) by mouth daily (with dinner) 30 tablet 11    metFORMIN (GLUCOPHAGE XR) 500 MG 24 hr tablet Take 2,000 mg by mouth daily (with dinner)      methocarbamol (ROBAXIN) 500 MG tablet Take 250 mg by mouth every 6 hours as needed for muscle spasms      metoprolol succinate ER (TOPROL XL) 25 MG 24 hr tablet Take 25 mg by mouth every morning HOLD if SBP<100 and/or HR<55      miconazole (MICATIN) 2 % external powder Apply 1 Application topically every 12 hours as needed for itching Apply to affected areas q12 hrs prn for rash skin irritation in morning and before bed      oxyCODONE (ROXICODONE) 5 MG tablet Take 1 tablet (5 mg) by mouth every 12 hours as needed for pain 60 tablet 0    polyethylene glycol (MIRALAX) 17 g packet Take 17 g by mouth every 12 hours as needed for constipation      QUEtiapine (SEROQUEL) 50 MG tablet Take 1 tablet (50 mg) by mouth 2 times daily For MDD with psychotic feautes 60 tablet 11    senna-docusate (SENOKOT-S/PERICOLACE) 8.6-50 MG tablet Take 2 tablets by mouth 2 times daily constipation      tamsulosin (FLOMAX) 0.4 MG capsule Take 1 capsule by mouth at bedtime BPH hyperplasia with lower urinary tract      traZODone (DESYREL) 50 MG tablet Take 50 mg by mouth at bedtime      Vitamin D3 (VITAMIN D-1000 MAX ST) 25 mcg (1000 units) tablet Take 1,000 Units by  "mouth daily For vitamin d def       Medications reviewed:  Medications reconciled to facility chart and changes were made to reflect current medications as identified as above med list. Below are the changes that were made:   Medications stopped since last EPIC medication reconciliation:   Medications Discontinued During This Encounter   Medication Reason    pregabalin (LYRICA) 75 MG capsule Med Rec(No AVS / No eCancel)     Medications started since last [facility] medication reconciliation:  Orders Placed This Encounter   Medications    traZODone (DESYREL) 50 MG tablet     Sig: Take 50 mg by mouth at bedtime       REVIEW OF SYSTEMS:  Unable to be obtained due to cognitive impairment or aphasia. [facility] 0/15    PHYSICAL EXAM:  /67   Pulse 73   Temp 97.5  F (36.4  C)   Resp 16   Ht 1.768 m (5' 9.6\")   Wt 74.8 kg (164 lb 12.8 oz)   SpO2 93%   BMI 23.92 kg/m    Gen: sitting in a wheelchair, alert, cooperative and in no acute distress  Resp: breathing non labored, no tachypnea  Ext:  s/p R BKA  Neuro: CX II-XII grossly in tact; ROM in all four extremities grossly in tact  Psych: memory, judgement and insight impaired    LABS/IMAGING: Reviewed as per Epic and/or [facility]    ASSESSMENT / PLAN:    DM, Type II  Hgb A1c - none. Glargine decreased from 15 units in late January due to lower afternoon sugars.  Recent Sugars 140s-180s (am) and 160s-180s (delfin)   -- glargine 10 units in the morning  -- metformin 2000 mg with dinner  -- follow sugars and adjust insulin as needed     Chronic Atrial Fibrillation  HR 70s. SBPs 120s-130s.   -- metoprolol XL 25 mg daily   -- apixaban 5 mg BID     PTSD  Depression with Psychosis  Cognitive Impairment  Mood and spirits seemed OK today - he wants to go home.  -- quetiapine 50 mg BID  -- ongoing 24/7 nursing and supportive cares     PAD  S/p Right BKA  -- atorvastatin 20 mg at bedtime      Peripheral Neuropathy  No longer on pregabalin  -- duloxetine 60 mg BID, gabapentin 200 mg " TID  -- methocarbamol 250 mg q6h PRN, oxycodone 5 mg q12h PRN       Electronically signed by  Morenita Donovan MD

## 2024-02-20 NOTE — LETTER
2/20/2024        RE: Yandel Light  [address]  [facility] [address]        M [facility] GERIATRICS  REGULATORY VISIT  February 20, 2024    [facility] Medical Record Number:  [id number]  Place of Service where encounter took place:  [facility] () [[id number]]    Chief Complaint   Patient presents with     [facility] Regulatory       HPI:    Yandel Light is a 78 year old  (1945), who is being seen today for a federally mandated E/M visit at [facility] where he has resided since December 2023 after he was hospitalized with inability to care for himself at home. HPI information obtained from: facility chart records, facility staff, patient report, and [facility] chart review.    Today, Mr. Light is seen in his power wheelchair. He is a limited historian (BIMS 0/15). He was out to smoke, I found him returning his coffee mug to the [facility] dining room and then we went back outside while talking. He tells me he just wants to go home and is planning to go home this weekend [facility]. [facility] also thinks [facility].       ALLERGIES:    Allergies   Allergen Reactions     Lisinopril Anaphylaxis and Other (See Comments)     Codeine GI Disturbance     Penicillins Nausea, Nausea and Vomiting and Other (See Comments)        Past Medical, Surgical, Family and Social History: Reviewed and updated in EPIC.    MEDICATIONS:  Current Outpatient Medications   Medication Sig Dispense Refill     acetaminophen (TYLENOL) 500 MG tablet Take 1,000 mg by mouth every 6 hours as needed for pain       albuterol (PROAIR HFA/PROVENTIL HFA/VENTOLIN HFA) 108 (90 Base) MCG/ACT inhaler Inhale 2 puffs into the lungs every 4 hours as needed for shortness of breath       ammonium lactate (LAC-HYDRIN) 12 % external lotion Apply topically daily Apply to skin topically once daily for dry skin       apixaban ANTICOAGULANT (ELIQUIS) 5 MG tablet  Take 1 tablet by mouth 2 times daily       atorvastatin (LIPITOR) 40 MG tablet Take 20 mg by mouth at bedtime For cholesterol       bisacodyl (DULCOLAX) 5 MG EC tablet Take 10 mg by mouth daily as needed for constipation       DULoxetine (CYMBALTA) 60 MG capsule Take 60 mg by mouth 2 times daily       finasteride (PROSCAR) 5 MG tablet Take 5 mg by mouth every morning For BPH with lower urinary tract       gabapentin (NEURONTIN) 100 MG capsule Take 200 mg by mouth 3 times daily For neuropathy       Insulin Glargine w/ Trans Port 100 UNIT/ML SOPN Inject 10 Units into the muscle daily Decreased from 15 units starting 1/23/24       magnesium oxide (MAG-OX) 400 MG tablet Take 400 mg by mouth 2 times daily       melatonin 3 MG tablet Take 1 tablet (3 mg) by mouth daily (with dinner) 30 tablet 11     metFORMIN (GLUCOPHAGE XR) 500 MG 24 hr tablet Take 2,000 mg by mouth daily (with dinner)       methocarbamol (ROBAXIN) 500 MG tablet Take 250 mg by mouth every 6 hours as needed for muscle spasms       metoprolol succinate ER (TOPROL XL) 25 MG 24 hr tablet Take 25 mg by mouth every morning HOLD if SBP<100 and/or HR<55       miconazole (MICATIN) 2 % external powder Apply 1 Application topically every 12 hours as needed for itching Apply to affected areas q12 hrs prn for rash skin irritation in morning and before bed       oxyCODONE (ROXICODONE) 5 MG tablet Take 1 tablet (5 mg) by mouth every 12 hours as needed for pain 60 tablet 0     polyethylene glycol (MIRALAX) 17 g packet Take 17 g by mouth every 12 hours as needed for constipation       QUEtiapine (SEROQUEL) 50 MG tablet Take 1 tablet (50 mg) by mouth 2 times daily For MDD with psychotic feautes 60 tablet 11     senna-docusate (SENOKOT-S/PERICOLACE) 8.6-50 MG tablet Take 2 tablets by mouth 2 times daily constipation       tamsulosin (FLOMAX) 0.4 MG capsule Take 1 capsule by mouth at bedtime BPH hyperplasia with lower urinary tract       traZODone (DESYREL) 50 MG tablet  "Take 50 mg by mouth at bedtime       Vitamin D3 (VITAMIN D-1000 MAX ST) 25 mcg (1000 units) tablet Take 1,000 Units by mouth daily For vitamin d def       Medications reviewed:  Medications reconciled to facility chart and changes were made to reflect current medications as identified as above med list. Below are the changes that were made:   Medications stopped since last EPIC medication reconciliation:   Medications Discontinued During This Encounter   Medication Reason     pregabalin (LYRICA) 75 MG capsule Med Rec(No AVS / No eCancel)     Medications started since last [facility] medication reconciliation:  Orders Placed This Encounter   Medications     traZODone (DESYREL) 50 MG tablet     Sig: Take 50 mg by mouth at bedtime       REVIEW OF SYSTEMS:  Unable to be obtained due to cognitive impairment or aphasia. [place] 0/15    PHYSICAL EXAM:  /67   Pulse 73   Temp 97.5  F (36.4  C)   Resp 16   Ht 1.768 m (5' 9.6\")   Wt 74.8 kg (164 lb 12.8 oz)   SpO2 93%   BMI 23.92 kg/m    Gen: sitting in a wheelchair, alert, cooperative and in no acute distress  Resp: breathing non labored, no tachypnea  Ext:  s/p R BKA  Neuro: CX II-XII grossly in tact; ROM in all four extremities grossly in tact  Psych: memory, judgement and insight impaired    LABS/IMAGING: Reviewed as per Epic and/or [facility]    ASSESSMENT / PLAN:    DM, Type II  Hgb A1c - none. Glargine decreased from 15 units in late January due to lower afternoon sugars.  Recent Sugars 140s-180s (am) and 160s-180s (delfin)   -- glargine 10 units in the morning  -- metformin 2000 mg with dinner  -- follow sugars and adjust insulin as needed     Chronic Atrial Fibrillation  HR 70s. SBPs 120s-130s.   -- metoprolol XL 25 mg daily   -- apixaban 5 mg BID     PTSD  Depression with Psychosis  Cognitive Impairment  Mood and spirits seemed OK today - he wants to go home.  -- quetiapine 50 mg BID  -- ongoing 24/7 nursing and supportive cares     PAD  S/p Right BKA  -- " atorvastatin 20 mg at bedtime      Peripheral Neuropathy  No longer on pregabalin  -- duloxetine 60 mg BID, gabapentin 200 mg TID  -- methocarbamol 250 mg q6h PRN, oxycodone 5 mg q12h PRN       Electronically signed by  Morenita Donovan MD              Sincerely,        Morenita Donovan MD

## 2024-03-05 ENCOUNTER — TELEPHONE (OUTPATIENT)
Dept: GERIATRICS | Facility: CLINIC | Age: 79
End: 2024-03-05
Payer: COMMERCIAL

## 2024-03-05 DIAGNOSIS — G89.29 OTHER CHRONIC PAIN: ICD-10-CM

## 2024-03-05 RX ORDER — OXYCODONE HYDROCHLORIDE 5 MG/1
TABLET ORAL
Qty: 120 TABLET | Refills: 0 | Status: SHIPPED | OUTPATIENT
Start: 2024-03-05

## 2024-03-05 RX ORDER — ACETAMINOPHEN 500 MG
TABLET ORAL
Qty: 240 TABLET | Refills: 11 | Status: SHIPPED | OUTPATIENT
Start: 2024-03-05

## 2024-03-05 NOTE — TELEPHONE ENCOUNTER
"[facility] TELEPHONE ENCOUNTER    Chief Complaint   Patient presents with    Pain       Yandel Light is a 78 year old  (1945),Nurse called today to report: Therapy reports: \" My therapists just brought up to me that Jed is having increased phantom tingling/pain in his RLE which is his amputated leg. The tingling/pain is causing an issue and limits him throughout the day. He also has increased L foot pain but I am attributing that to him being up on his feet and starting to walk.\" Previously was on lyrica 150mg BID with gabapentin in past, however per pharmacy recommendations was to stop one agent due to risks, therefore gabapentin was increased and lyrica was GDR and eventually discontinued altogether. Has PRN agents but staff have been very limited on using them or if any.       ASSESSMENT/PLAN    Change oxycodone to 5mg BID scheduled and BID PRN  Change Tylenol to 1000mg TID and daily PRN  Continue duloxetine 60mg BID  Continue robaxin PRN (staff have not been giving. Limited use?)    Electronically signed by:   DHAVAL Segura CNP    "

## 2024-03-13 ENCOUNTER — NURSING HOME VISIT (OUTPATIENT)
Dept: GERIATRICS | Facility: CLINIC | Age: 79
End: 2024-03-13
Payer: COMMERCIAL

## 2024-03-13 VITALS
DIASTOLIC BLOOD PRESSURE: 66 MMHG | RESPIRATION RATE: 18 BRPM | WEIGHT: 165.4 LBS | OXYGEN SATURATION: 95 % | SYSTOLIC BLOOD PRESSURE: 132 MMHG | HEIGHT: 70 IN | TEMPERATURE: 97.8 F | HEART RATE: 78 BPM | BODY MASS INDEX: 23.68 KG/M2

## 2024-03-13 DIAGNOSIS — Z79.4 TYPE 2 DIABETES MELLITUS WITH OTHER SPECIFIED COMPLICATION, WITH LONG-TERM CURRENT USE OF INSULIN (H): Primary | ICD-10-CM

## 2024-03-13 DIAGNOSIS — E11.69 TYPE 2 DIABETES MELLITUS WITH OTHER SPECIFIED COMPLICATION, WITH LONG-TERM CURRENT USE OF INSULIN (H): Primary | ICD-10-CM

## 2024-03-13 PROCEDURE — 99207 PR NO CHARGE LOS: CPT | Performed by: INTERNAL MEDICINE

## 2024-03-13 NOTE — PROGRESS NOTES
[facility] GERIATRICS  REGULATORY VISIT  March 13, 2024      [facility] Medical Record Number:  [id number]  Place of Service where encounter took place:  [facility] () [[id number]]    Chief Complaint   Patient presents with    long term Regulatory       HPI:    Yandel Light is a 78 year old  (1945), who I attempted to see today for a federally mandated E/M visit at [facility] where he has resided since December 2023 after he was hospitalized with inability to care for himself at home.     I was unable to locate Mr. Light.  He was not in his room and he was not outside smoking.  I am not sure if he was in the lower level cafeteria or in an elevator and we just kept passing each other as I looked for him a few separate times this morning. I reviewed VS, reconciled medications between [facility] and [facility]. SBPs 120s-130s. HR 70s. Weight 161 lbs (Dec) --> 165 lbs (Feb). Sugars 140s-180s (am) and 110s-170s (delfin/hs) -- he has done well on the lower dose of glargine.     MEDICATIONS:  Current Outpatient Medications   Medication Sig Dispense Refill    acetaminophen (TYLENOL) 500 MG tablet Take 2 tablets (1,000 mg) by mouth 3 times daily. May also take 2 tablets (1,000 mg) daily as needed for pain. 240 tablet 11    albuterol (PROAIR HFA/PROVENTIL HFA/VENTOLIN HFA) 108 (90 Base) MCG/ACT inhaler Inhale 2 puffs into the lungs every 4 hours as needed for shortness of breath      ammonium lactate (LAC-HYDRIN) 12 % external lotion Apply topically daily Apply to skin topically once daily for dry skin      apixaban ANTICOAGULANT (ELIQUIS) 5 MG tablet Take 1 tablet by mouth 2 times daily      atorvastatin (LIPITOR) 40 MG tablet Take 20 mg by mouth at bedtime For cholesterol      bisacodyl (DULCOLAX) 5 MG EC tablet Take 10 mg by mouth daily as needed for constipation      DULoxetine (CYMBALTA) 60 MG capsule Take 60 mg by mouth 2 times daily      finasteride (PROSCAR) 5 MG tablet Take 5 mg by mouth every  morning For BPH with lower urinary tract      gabapentin (NEURONTIN) 100 MG capsule Take 200 mg by mouth 3 times daily For neuropathy      Insulin Glargine w/ Trans Port 100 UNIT/ML SOPN Inject 10 Units into the muscle daily Decreased from 15 units starting 1/23/24      magnesium oxide (MAG-OX) 400 MG tablet Take 400 mg by mouth 2 times daily      melatonin 3 MG tablet Take 1 tablet (3 mg) by mouth daily (with dinner) 30 tablet 11    metFORMIN (GLUCOPHAGE XR) 500 MG 24 hr tablet Take 2,000 mg by mouth daily (with dinner)      methocarbamol (ROBAXIN) 500 MG tablet Take 250 mg by mouth every 6 hours as needed for muscle spasms      metoprolol succinate ER (TOPROL XL) 25 MG 24 hr tablet Take 25 mg by mouth every morning HOLD if SBP<100 and/or HR<55      miconazole (MICATIN) 2 % external powder Apply 1 Application topically every 12 hours as needed for itching Apply to affected areas q12 hrs prn for rash skin irritation in morning and before bed      oxyCODONE (ROXICODONE) 5 MG tablet Take 1 tablet (5 mg) by mouth 2 times daily. May also take 1 tablet (5 mg) 2 times daily as needed for pain. 120 tablet 0    polyethylene glycol (MIRALAX) 17 g packet Take 17 g by mouth every 12 hours as needed for constipation      QUEtiapine (SEROQUEL) 50 MG tablet Take 1 tablet (50 mg) by mouth 2 times daily For MDD with psychotic feautes 60 tablet 11    senna-docusate (SENOKOT-S/PERICOLACE) 8.6-50 MG tablet Take 2 tablets by mouth 2 times daily constipation      tamsulosin (FLOMAX) 0.4 MG capsule Take 1 capsule by mouth at bedtime BPH hyperplasia with lower urinary tract      traZODone (DESYREL) 50 MG tablet Take 50 mg by mouth at bedtime      Vitamin D3 (VITAMIN D-1000 MAX ST) 25 mcg (1000 units) tablet Take 1,000 Units by mouth daily For vitamin d def       Medications reviewed:  Medications reconciled to facility chart and changes were made to reflect current medications as identified as above med list. Below are the changes that  were made:   Medications stopped since last EPIC medication reconciliation:   There are no discontinued medications.  Medications started since last [facility] medication reconciliation:  No orders of the defined types were placed in this encounter.      Electronically signed by  Morenita Donovan MD

## 2024-03-13 NOTE — LETTER
3/13/2024        RE: Yandel Light  [address]  [place] [address]        M [facility] GERIATRICS  REGULATORY VISIT  March 13, 2024      [facility] Medical Record Number:  [id number]  Place of Service where encounter took place:  [facility] () [[id number]]    Chief Complaint   Patient presents with     long-term Regulatory       HPI:    Yandel Light is a 78 year old  (1945), who I attempted to see today for a federally mandated E/M visit at [facility] where he has resided since December 2023 after he was hospitalized with inability to care for himself at home.     I was unable to locate Mr. Light.  He was not in his room and he was not outside smoking.  I am not sure if he was in the lower level cafeteria or in an elevator and we just kept passing each other as I looked for him a few separate times this morning. I reviewed VS, reconciled medications between [facility] and [facility]. SBPs 120s-130s. HR 70s. Weight 144 lbs (Jan) --> 137 lbs. Sugars 140s-180s (am) and 110s-170s (delfin/hs) -- he has done well on the lower dose of glargine.     MEDICATIONS:  Current Outpatient Medications   Medication Sig Dispense Refill     acetaminophen (TYLENOL) 500 MG tablet Take 2 tablets (1,000 mg) by mouth 3 times daily. May also take 2 tablets (1,000 mg) daily as needed for pain. 240 tablet 11     albuterol (PROAIR HFA/PROVENTIL HFA/VENTOLIN HFA) 108 (90 Base) MCG/ACT inhaler Inhale 2 puffs into the lungs every 4 hours as needed for shortness of breath       ammonium lactate (LAC-HYDRIN) 12 % external lotion Apply topically daily Apply to skin topically once daily for dry skin       apixaban ANTICOAGULANT (ELIQUIS) 5 MG tablet Take 1 tablet by mouth 2 times daily       atorvastatin (LIPITOR) 40 MG tablet Take 20 mg by mouth at bedtime For cholesterol       bisacodyl (DULCOLAX) 5 MG EC tablet Take 10 mg by mouth daily as needed for constipation       DULoxetine (CYMBALTA) 60 MG capsule Take 60  mg by mouth 2 times daily       finasteride (PROSCAR) 5 MG tablet Take 5 mg by mouth every morning For BPH with lower urinary tract       gabapentin (NEURONTIN) 100 MG capsule Take 200 mg by mouth 3 times daily For neuropathy       Insulin Glargine w/ Trans Port 100 UNIT/ML SOPN Inject 10 Units into the muscle daily Decreased from 15 units starting 1/23/24       magnesium oxide (MAG-OX) 400 MG tablet Take 400 mg by mouth 2 times daily       melatonin 3 MG tablet Take 1 tablet (3 mg) by mouth daily (with dinner) 30 tablet 11     metFORMIN (GLUCOPHAGE XR) 500 MG 24 hr tablet Take 2,000 mg by mouth daily (with dinner)       methocarbamol (ROBAXIN) 500 MG tablet Take 250 mg by mouth every 6 hours as needed for muscle spasms       metoprolol succinate ER (TOPROL XL) 25 MG 24 hr tablet Take 25 mg by mouth every morning HOLD if SBP<100 and/or HR<55       miconazole (MICATIN) 2 % external powder Apply 1 Application topically every 12 hours as needed for itching Apply to affected areas q12 hrs prn for rash skin irritation in morning and before bed       oxyCODONE (ROXICODONE) 5 MG tablet Take 1 tablet (5 mg) by mouth 2 times daily. May also take 1 tablet (5 mg) 2 times daily as needed for pain. 120 tablet 0     polyethylene glycol (MIRALAX) 17 g packet Take 17 g by mouth every 12 hours as needed for constipation       QUEtiapine (SEROQUEL) 50 MG tablet Take 1 tablet (50 mg) by mouth 2 times daily For MDD with psychotic feautes 60 tablet 11     senna-docusate (SENOKOT-S/PERICOLACE) 8.6-50 MG tablet Take 2 tablets by mouth 2 times daily constipation       tamsulosin (FLOMAX) 0.4 MG capsule Take 1 capsule by mouth at bedtime BPH hyperplasia with lower urinary tract       traZODone (DESYREL) 50 MG tablet Take 50 mg by mouth at bedtime       Vitamin D3 (VITAMIN D-1000 MAX ST) 25 mcg (1000 units) tablet Take 1,000 Units by mouth daily For vitamin d def       Medications reviewed:  Medications reconciled to facility chart and  changes were made to reflect current medications as identified as above med list. Below are the changes that were made:   Medications stopped since last EPIC medication reconciliation:   There are no discontinued medications.  Medications started since last EPIC medication reconciliation:  No orders of the defined types were placed in this encounter.      Electronically signed by  Morenita Donovan MD              Sincerely,        Morenita Donovan MD

## 2024-03-18 NOTE — PROGRESS NOTES
[facility] GERIATRICS    Chief Complaint   Patient presents with    RECHECK     Mood/behaviors     HPI:  Yandel Light is a 78 year old  (1945), who is being seen today for an episodic care visit at: [facility] () [[id number]]. Today's concern is: The primary encounter diagnosis was Type 2 diabetes mellitus with other specified complication, with long-term current use of insulin (H). Diagnoses of Open wound, Other chronic pain, Polyneuropathy associated with underlying disease (H24), Status post below-knee amputation of right lower extremity (H), PTSD (post-traumatic stress disorder), Paroxysmal atrial fibrillation (H), Severe episode of recurrent major depressive disorder, with psychotic features (H), Chronic pain syndrome, Chronic obstructive pulmonary disease, unspecified COPD type (H), Unable to care for self, Resides in long term care facility, Generalized muscle weakness, Physical deconditioning, Neuropathy, Benign prostatic hyperplasia, unspecified whether lower urinary tract symptoms present, Chronic anticoagulation, Hypertension, unspecified type, Type 2 diabetes mellitus with other skin ulcer, unspecified whether long term insulin use (H), Tobacco abuse disorder, Alcohol dependence with unspecified alcohol-induced disorder (H), Osteomyelitis of ankle and foot (H), and Moderate malnutrition (H24) were also pertinent to this visit.    Met with patient who was found sitting upright in bed while maintenance repair his electronic wheelchair. It was reported that on Friday 3/15 patient scheduled his own transportation/taxi multiple times to transport him back home. Staff was able to convince him to stay, however now this morning he reports wanting to discharge home again. I did report/advise him that going home is not safe due to his high fall risks and repeat hospitalizations, however patient not open to discussing this further. I did remind him that if he leaves it will be considered AMA. He  "denies any chest pain, palpitations, shortness of breath, CLARK, lightheadedness, dizziness, or cough. Denies any abdominal discomfort. Denies N&V. Incontinent of B&B. Oral intake fair. Often noncompliant with medications at times. Noncompliance with lab obtainment. Sleeping well. At times can be verbally aggressive to staff.     BP Readings from Last 3 Encounters:   03/19/24 125/81   03/13/24 132/66   02/20/24 134/67     Wt Readings from Last 5 Encounters:   03/19/24 75 kg (165 lb 6.4 oz)   03/13/24 75 kg (165 lb 6.4 oz)   02/20/24 74.8 kg (164 lb 12.8 oz)   01/22/24 73.5 kg (162 lb)   12/26/23 73 kg (161 lb)     Allergies, and PMH/PSH reviewed in EPIC today.  REVIEW OF SYSTEMS:  Unobtainable secondary to cognitive impairment.     Objective:   /81   Pulse 78   Temp 97.8  F (36.6  C)   Resp 18   Ht 1.768 m (5' 9.6\")   Wt 75 kg (165 lb 6.4 oz)   SpO2 95%   BMI 24.01 kg/m    GENERAL APPEARANCE:  Alert, uncooperative  ENT:  Mouth and posterior oropharynx normal, moist mucous membranes, [place]  EYES:  EOM, conjunctivae, lids, pupils and irises normal  NECK:  No adenopathy,masses or thyromegaly  RESP:  respiratory effort and palpation of chest normal, lungs clear to auscultation , no respiratory distress  CV:  Palpation and auscultation of heart done , regular rate and rhythm, no murmur, rub, or gallop, no edema  ABDOMEN:  normal bowel sounds, soft, nontender, no hepatosplenomegaly or other masses, no guarding or rebound  M/S:   Requires assistance for ADLs, transfers and cares. Not able to apply prosethetic to RLE himself independently. High fall risks  SKIN:  Inspection of skin and subcutaneous tissue baseline, see photo below. Non healing  NEURO:   Cranial nerves 2-12 are normal tested and grossly at patient's baseline, no purposeful movement in upper and lower extremities  PSYCH:  oriented to self, insight and judgement impaired, memory impaired , affect and mood normal    Wound appearance on " 3/13/24:      Labs done in SNF are in [place] EPIC. Please refer to them using EPIC/Care Everywhere.  Multiple attempts to collect labs on site. Every attempt he has continued to decline    ASSESSMENT/PLAN:     (R53.81) Physical deconditioning  (primary encounter diagnosis)  (M62.81) Generalized muscle weakness  (Z78.9) Unable to care for self  (Z78.9) Resides in long term care facility  Comment: Acute on chronic. The patient had been hospitalized in our facility 8/29/2023 to 9/1/2023 for generalized weakness and physical debility with inability to care for himself and was discharged to transitional care unit and has since transition back home apparently of the patient's own volition. This admission, patient presents once again with inability to care for himself with no particular specific complaints. After transitioning back home, he fell and had mild trauma with a skin tear to the right wrist. Upon evaluation, patient had an unremarkable workup other than some elevated blood pressures that recovered nicely and an uncomplicated UTI that was successfully treated during this hospitalization. No acute abnormalities were identified.   Plan:   -Continue Physical therapy and Occupational therapy as directed  -SW to remain involved  -Recommend LTC/residential for ongoing needs and cares     (Z89.511) Status post below-knee amputation of right lower extremity (H)  (G89.4) Chronic pain syndrome  (G62.9) Neuropathy  Comment: Chronic. Previously followed by [facility].   Plan:   -Monitor pain complaints  -Monitor for worsening s/symptoms of concerns  -Continue gabapentin 300mg TID  -Continue oxycodone 5mg BID and PRN  -Discontinue robaxin due to non use  -Continue Tylenol 1000mg TID and PRN  -Attempted several times for lab collection, however he continues to decline/refuse     (N40.0) Benign prostatic hyperplasia, unspecified whether lower urinary tract symptoms present  (N39.0) Urinary tract infection  without hematuria, site unspecified  Comment: Chronic. This admission, patient presents once again with inability to care for himself with no particular specific complaints. After transitioning back home, he fell and had mild trauma with a skin tear to the right wrist. Upon evaluation, patient had an unremarkable workup other than some elevated blood pressures that recovered nicely and an uncomplicated UTI that was successfully treated during this hospitalization. No acute abnormalities were identified.  Plan:   -Monitor urinary status  -Continue finasteride 5mg daily  -Continue flomax 0.4mg daily  -Attempted several times for lab collection, however he continues to decline/refuse     (I48.0) Paroxysmal atrial fibrillation (H)  (Z79.01) Chronic anticoagulation  (I10) Hypertension, unspecified type  Comment: Chronic. Based on JNC-8 goals,  patients age of 78 year old, presence of diabetes or CKD, and goals of care goal BP is  <140/90 mm Hg. Patient is stable with current plan of care and routine assessment..Baseline Creatinine~0.6-0.9  Plan:   -Monitor BP and HR  -Continue atorvastatin 20mg daily  -Continue eliquis 5mg BID  -Continue metoprolol succinate 25mg daily. HOLD if SBP<100 and/or HR<55  -Attempted several times for lab collection, however he continues to decline/refuse         (J44.9) Chronic obstructive pulmonary disease, unspecified COPD type (H)  (Z72.0) Tobacco abuse disorder  Comment: Chronic. Continues to smoke despite education  Plan:   -Monitor for worsening s/symptoms of concerns  -Monitor respiratory status  -Continue albuterol PRN  -Attempted several times for lab collection, however he continues to decline/refuse     (F33.3) Severe episode of recurrent major depressive disorder, with psychotic features (H)  (F43.10) PTSD (post-traumatic stress disorder)  (F10.29) History of alcohol dependence  (E434.0) Moderate malnutrition  Comment: Acute on chronic. Per recent hospital discharge records-Patient  has been on stable psychotropic [facility] His diagnosis of severe major depression with psychotic features is a recognized indication for the utilization of quetiapine as augmentation for duloxetine and seems to have served the patient well. Increased behaviors since LTC admission.  Plan:   -ACP on site for ongoing needs  -Continue to monitor for worsening s/symptoms of concerns  -Monitor for changes in mood and behaviors  -Monitor for changes in mobility, eating and sleeping patterns  -Continue melatonin 3mg daily with dinner.   -Continue duloxetine 60mg BID  -Continue seroquel 50mg BID for now. Not appropriate for GDR at this time given history/poor compliance  -Attempted several times for lab collection, however he continues to decline/refuse     (T14.8XXA) Open wound  (M86.9) History of osteomyelitis of ankle and foot  (E11.622,L98.499) Type 2 diabetes mellitus with other skin ulcer  Comment: Acute on chronic. Suspect S/T chronic falls, poor hygiene compliance and not able to care for self.   Plan:  -Monitor for worsening s/symptoms of concerns  -Encourage compliance of PRAFO/ROOKE boot when in bed  -Change wound care as directed:   *Wound care to L lateral ankle: cleanse with NS. Pat Dry. Apply NS moistened hydrofera blue to wound and cover with mepilex. Perform daily and PRN  -Attempted several times for lab collection, however he continues to decline/refuse     (E11.69) Type 2 diabetes mellitus with other specified complication, unspecified whether long term insulin use (H)  Comment: Chronic. Last A1c per chart review was July 2021 with results 12.5%. Goal <9%.   Plan:   -Continue metformin 2000mg daily in AM  -Reduce lantus down to 8U daily. HOLD if Blood Glucose<120  -Monitor Blood Glucose BID as directed  -Attempted several times for lab collection, however he continues to decline/refuse         Electronically signed by:  Dr. Lauren Tracy DNP, APRN,  FNP-C, WCS-C, EDS-C

## 2024-03-19 ENCOUNTER — NURSING HOME VISIT (OUTPATIENT)
Dept: GERIATRICS | Facility: CLINIC | Age: 79
End: 2024-03-19
Payer: COMMERCIAL

## 2024-03-19 VITALS
HEART RATE: 78 BPM | WEIGHT: 165.4 LBS | BODY MASS INDEX: 23.68 KG/M2 | OXYGEN SATURATION: 95 % | TEMPERATURE: 97.8 F | DIASTOLIC BLOOD PRESSURE: 81 MMHG | SYSTOLIC BLOOD PRESSURE: 125 MMHG | RESPIRATION RATE: 18 BRPM | HEIGHT: 70 IN

## 2024-03-19 DIAGNOSIS — Z78.9 UNABLE TO CARE FOR SELF: ICD-10-CM

## 2024-03-19 DIAGNOSIS — Z79.01 CHRONIC ANTICOAGULATION: ICD-10-CM

## 2024-03-19 DIAGNOSIS — Z78.9 RESIDES IN LONG TERM CARE FACILITY: ICD-10-CM

## 2024-03-19 DIAGNOSIS — I10 HYPERTENSION, UNSPECIFIED TYPE: ICD-10-CM

## 2024-03-19 DIAGNOSIS — L98.499 TYPE 2 DIABETES MELLITUS WITH OTHER SKIN ULCER, UNSPECIFIED WHETHER LONG TERM INSULIN USE (H): ICD-10-CM

## 2024-03-19 DIAGNOSIS — G63 POLYNEUROPATHY ASSOCIATED WITH UNDERLYING DISEASE (H): ICD-10-CM

## 2024-03-19 DIAGNOSIS — N40.0 BENIGN PROSTATIC HYPERPLASIA, UNSPECIFIED WHETHER LOWER URINARY TRACT SYMPTOMS PRESENT: ICD-10-CM

## 2024-03-19 DIAGNOSIS — G89.29 OTHER CHRONIC PAIN: ICD-10-CM

## 2024-03-19 DIAGNOSIS — Z89.511 STATUS POST BELOW-KNEE AMPUTATION OF RIGHT LOWER EXTREMITY (H): ICD-10-CM

## 2024-03-19 DIAGNOSIS — F10.29 ALCOHOL DEPENDENCE WITH UNSPECIFIED ALCOHOL-INDUCED DISORDER (H): ICD-10-CM

## 2024-03-19 DIAGNOSIS — M86.9 OSTEOMYELITIS OF ANKLE AND FOOT (H): ICD-10-CM

## 2024-03-19 DIAGNOSIS — F33.3 SEVERE EPISODE OF RECURRENT MAJOR DEPRESSIVE DISORDER, WITH PSYCHOTIC FEATURES (H): ICD-10-CM

## 2024-03-19 DIAGNOSIS — G62.9 NEUROPATHY: ICD-10-CM

## 2024-03-19 DIAGNOSIS — I48.0 PAROXYSMAL ATRIAL FIBRILLATION (H): ICD-10-CM

## 2024-03-19 DIAGNOSIS — Z72.0 TOBACCO ABUSE DISORDER: ICD-10-CM

## 2024-03-19 DIAGNOSIS — G89.4 CHRONIC PAIN SYNDROME: ICD-10-CM

## 2024-03-19 DIAGNOSIS — F43.10 PTSD (POST-TRAUMATIC STRESS DISORDER): ICD-10-CM

## 2024-03-19 DIAGNOSIS — T14.8XXA OPEN WOUND: ICD-10-CM

## 2024-03-19 DIAGNOSIS — M62.81 GENERALIZED MUSCLE WEAKNESS: ICD-10-CM

## 2024-03-19 DIAGNOSIS — E44.0 MODERATE MALNUTRITION (H): ICD-10-CM

## 2024-03-19 DIAGNOSIS — E11.69 TYPE 2 DIABETES MELLITUS WITH OTHER SPECIFIED COMPLICATION, WITH LONG-TERM CURRENT USE OF INSULIN (H): Primary | ICD-10-CM

## 2024-03-19 DIAGNOSIS — R53.81 PHYSICAL DECONDITIONING: ICD-10-CM

## 2024-03-19 DIAGNOSIS — E11.622 TYPE 2 DIABETES MELLITUS WITH OTHER SKIN ULCER, UNSPECIFIED WHETHER LONG TERM INSULIN USE (H): ICD-10-CM

## 2024-03-19 DIAGNOSIS — Z79.4 TYPE 2 DIABETES MELLITUS WITH OTHER SPECIFIED COMPLICATION, WITH LONG-TERM CURRENT USE OF INSULIN (H): Primary | ICD-10-CM

## 2024-03-19 DIAGNOSIS — J44.9 CHRONIC OBSTRUCTIVE PULMONARY DISEASE, UNSPECIFIED COPD TYPE (H): ICD-10-CM

## 2024-03-19 PROCEDURE — 99309 SBSQ NF CARE MODERATE MDM 30: CPT | Performed by: NURSE PRACTITIONER

## 2024-03-19 NOTE — LETTER
3/19/2024        RE: Yandel Light  [address] [facility] [address]        [facility] GERIATRICS    Chief Complaint   Patient presents with     RECHECK     Mood/behaviors     HPI:  Yandel Light is a 78 year old  (1945), who is being seen today for an episodic care visit at: [facility] () [[id number]]. Today's concern is: The primary encounter diagnosis was Type 2 diabetes mellitus with other specified complication, with long-term current use of insulin (H). Diagnoses of Open wound, Other chronic pain, Polyneuropathy associated with underlying disease (H24), Status post below-knee amputation of right lower extremity (H), PTSD (post-traumatic stress disorder), Paroxysmal atrial fibrillation (H), Severe episode of recurrent major depressive disorder, with psychotic features (H), Chronic pain syndrome, Chronic obstructive pulmonary disease, unspecified COPD type (H), Unable to care for self, Resides in long term care facility, Generalized muscle weakness, Physical deconditioning, Neuropathy, Benign prostatic hyperplasia, unspecified whether lower urinary tract symptoms present, Chronic anticoagulation, Hypertension, unspecified type, Type 2 diabetes mellitus with other skin ulcer, unspecified whether long term insulin use (H), Tobacco abuse disorder, Alcohol dependence with unspecified alcohol-induced disorder (H), Osteomyelitis of ankle and foot (H), and Moderate malnutrition (H24) were also pertinent to this visit.    Met with patient who was found sitting upright in bed while maintenance repair his electronic wheelchair. It was reported that on Friday 3/15 patient scheduled his own transportation/taxi multiple times to transport him back home. Staff was able to convince him to stay, however now this morning he reports wanting to discharge home again. I did report/advise him that going home is not safe due to his high fall risks and repeat hospitalizations, however patient not open  "to discussing this further. I did remind him that if he leaves it will be considered AMA. He denies any chest pain, palpitations, shortness of breath, CLARK, lightheadedness, dizziness, or cough. Denies any abdominal discomfort. Denies N&V. Incontinent of B&B. Oral intake fair. Often noncompliant with medications at times. Noncompliance with lab obtainment. Sleeping well. At times can be verbally aggressive to staff.     BP Readings from Last 3 Encounters:   03/19/24 125/81   03/13/24 132/66   02/20/24 134/67     Wt Readings from Last 5 Encounters:   03/19/24 75 kg (165 lb 6.4 oz)   03/13/24 75 kg (165 lb 6.4 oz)   02/20/24 74.8 kg (164 lb 12.8 oz)   01/22/24 73.5 kg (162 lb)   12/26/23 73 kg (161 lb)     Allergies, and PMH/PSH reviewed in EPIC today.  REVIEW OF SYSTEMS:  Unobtainable secondary to cognitive impairment.     Objective:   /81   Pulse 78   Temp 97.8  F (36.6  C)   Resp 18   Ht 1.768 m (5' 9.6\")   Wt 75 kg (165 lb 6.4 oz)   SpO2 95%   BMI 24.01 kg/m    GENERAL APPEARANCE:  Alert, uncooperative  ENT:  Mouth and posterior oropharynx normal, moist mucous membranes, [place]  EYES:  EOM, conjunctivae, lids, pupils and irises normal  NECK:  No adenopathy,masses or thyromegaly  RESP:  respiratory effort and palpation of chest normal, lungs clear to auscultation , no respiratory distress  CV:  Palpation and auscultation of heart done , regular rate and rhythm, no murmur, rub, or gallop, no edema  ABDOMEN:  normal bowel sounds, soft, nontender, no hepatosplenomegaly or other masses, no guarding or rebound  M/S:   Requires assistance for ADLs, transfers and cares. Not able to apply prosethetic to RLE himself independently. High fall risks  SKIN:  Inspection of skin and subcutaneous tissue baseline, see photo below. Non healing  NEURO:   Cranial nerves 2-12 are normal tested and grossly at patient's baseline, no purposeful movement in upper and lower extremities  PSYCH:  oriented to self, insight and " judgement impaired, memory impaired , affect and mood normal    Wound appearance on 3/13/24:      Labs done in SNF are in [place] EPIC. Please refer to them using EPIC/Care Everywhere.  Multiple attempts to collect labs on site. Every attempt he has continued to decline    ASSESSMENT/PLAN:     (R53.81) Physical deconditioning  (primary encounter diagnosis)  (M62.81) Generalized muscle weakness  (Z78.9) Unable to care for self  (Z78.9) Resides in long term care facility  Comment: Acute on chronic. The patient had been hospitalized in our facility 8/29/2023 to 9/1/2023 for generalized weakness and physical debility with inability to care for himself and was discharged to transitional care unit and has since transition back home apparently of the patient's own volition. This admission, patient presents once again with inability to care for himself with no particular specific complaints. After transitioning back home, he fell and had mild trauma with a skin tear to the right wrist. Upon evaluation, patient had an unremarkable workup other than some elevated blood pressures that recovered nicely and an uncomplicated UTI that was successfully treated during this hospitalization. No acute abnormalities were identified.   Plan:   -Continue Physical therapy and Occupational therapy as directed  -SW to remain involved  -Recommend LTC/half-way for ongoing needs and cares     (Z89.511) Status post below-knee amputation of right lower extremity (H)  (G89.4) Chronic pain syndrome  (G62.9) Neuropathy  Comment: Chronic. Previously followed by [facility].   Plan:   -Monitor pain complaints  -Monitor for worsening s/symptoms of concerns  -Continue gabapentin 300mg TID  -Continue oxycodone 5mg BID and PRN  -Discontinue robaxin due to non use  -Continue Tylenol 1000mg TID and PRN  -Attempted several times for lab collection, however he continues to decline/refuse     (N40.0) Benign prostatic hyperplasia,  unspecified whether lower urinary tract symptoms present  (N39.0) Urinary tract infection without hematuria, site unspecified  Comment: Chronic. This admission, patient presents once again with inability to care for himself with no particular specific complaints. After transitioning back home, he fell and had mild trauma with a skin tear to the right wrist. Upon evaluation, patient had an unremarkable workup other than some elevated blood pressures that recovered nicely and an uncomplicated UTI that was successfully treated during this hospitalization. No acute abnormalities were identified.  Plan:   -Monitor urinary status  -Continue finasteride 5mg daily  -Continue flomax 0.4mg daily  -Attempted several times for lab collection, however he continues to decline/refuse     (I48.0) Paroxysmal atrial fibrillation (H)  (Z79.01) Chronic anticoagulation  (I10) Hypertension, unspecified type  Comment: Chronic. Based on JNC-8 goals,  patients age of 78 year old, presence of diabetes or CKD, and goals of care goal BP is  <140/90 mm Hg. Patient is stable with current plan of care and routine assessment..Baseline Creatinine~0.6-0.9  Plan:   -Monitor BP and HR  -Continue atorvastatin 20mg daily  -Continue eliquis 5mg BID  -Continue metoprolol succinate 25mg daily. HOLD if SBP<100 and/or HR<55  -Attempted several times for lab collection, however he continues to decline/refuse         (J44.9) Chronic obstructive pulmonary disease, unspecified COPD type (H)  (Z72.0) Tobacco abuse disorder  Comment: Chronic. Continues to smoke despite education  Plan:   -Monitor for worsening s/symptoms of concerns  -Monitor respiratory status  -Continue albuterol PRN  -Attempted several times for lab collection, however he continues to decline/refuse     (F33.3) Severe episode of recurrent major depressive disorder, with psychotic features (H)  (F43.10) PTSD (post-traumatic stress disorder)  (F10.29) History of alcohol dependence  (E434.0)  Moderate malnutrition  Comment: Acute on chronic. Per recent hospital discharge records-Patient has been on stable psychotropic [facility] His diagnosis of severe major depression with psychotic features is a recognized indication for the utilization of quetiapine as augmentation for duloxetine and seems to have served the patient well. Increased behaviors since LTC admission.  Plan:   -ACP on site for ongoing needs  -Continue to monitor for worsening s/symptoms of concerns  -Monitor for changes in mood and behaviors  -Monitor for changes in mobility, eating and sleeping patterns  -Continue melatonin 3mg daily with dinner.   -Continue duloxetine 60mg BID  -Continue seroquel 50mg BID for now. Not appropriate for GDR at this time given history/poor compliance  -Attempted several times for lab collection, however he continues to decline/refuse     (T14.8XXA) Open wound  (M86.9) History of osteomyelitis of ankle and foot  (E11.622,L98.499) Type 2 diabetes mellitus with other skin ulcer  Comment: Acute on chronic. Suspect S/T chronic falls, poor hygiene compliance and not able to care for self.   Plan:  -Monitor for worsening s/symptoms of concerns  -Encourage compliance of PRAFO/ROOKE boot when in bed  -Change wound care as directed:   *Wound care to L lateral ankle: cleanse with NS. Pat Dry. Apply NS moistened hydrofera blue to wound and cover with mepilex. Perform daily and PRN  -Attempted several times for lab collection, however he continues to decline/refuse     (E11.69) Type 2 diabetes mellitus with other specified complication, unspecified whether long term insulin use (H)  Comment: Chronic. Last A1c per chart review was July 2021 with results 12.5%. Goal <9%.   Plan:   -Continue metformin 2000mg daily in AM  -Reduce lantus down to 8U daily. HOLD if Blood Glucose<120  -Monitor Blood Glucose BID as directed  -Attempted several times for lab collection, however he  continues to decline/refuse         Electronically signed by:  Dr. Lauren Tracy DNP, APRN, FNP-C, WCS-C, EDS-C             Sincerely,        Lauren Tracy, DHAVAL CNP